# Patient Record
Sex: MALE | Race: OTHER
[De-identification: names, ages, dates, MRNs, and addresses within clinical notes are randomized per-mention and may not be internally consistent; named-entity substitution may affect disease eponyms.]

---

## 2023-09-07 ENCOUNTER — TRANSCRIPTION ENCOUNTER (OUTPATIENT)
Age: 53
End: 2023-09-07

## 2023-09-07 ENCOUNTER — INPATIENT (INPATIENT)
Facility: HOSPITAL | Age: 53
LOS: 1 days | Discharge: HOME | DRG: 419 | End: 2023-09-09
Attending: SURGERY | Admitting: SURGERY
Payer: COMMERCIAL

## 2023-09-07 VITALS
OXYGEN SATURATION: 96 % | SYSTOLIC BLOOD PRESSURE: 155 MMHG | WEIGHT: 188.5 LBS | RESPIRATION RATE: 18 BRPM | HEIGHT: 70.87 IN | HEART RATE: 88 BPM | TEMPERATURE: 98 F | DIASTOLIC BLOOD PRESSURE: 98 MMHG

## 2023-09-07 LAB
ALBUMIN SERPL ELPH-MCNC: 4.9 G/DL — SIGNIFICANT CHANGE UP (ref 3.3–5.2)
ALP SERPL-CCNC: 58 U/L — SIGNIFICANT CHANGE UP (ref 40–120)
ALT FLD-CCNC: 21 U/L — SIGNIFICANT CHANGE UP
ANION GAP SERPL CALC-SCNC: 14 MMOL/L — SIGNIFICANT CHANGE UP (ref 5–17)
APTT BLD: 30.3 SEC — SIGNIFICANT CHANGE UP (ref 24.5–35.6)
AST SERPL-CCNC: 20 U/L — SIGNIFICANT CHANGE UP
BASOPHILS # BLD AUTO: 0.06 K/UL — SIGNIFICANT CHANGE UP (ref 0–0.2)
BASOPHILS NFR BLD AUTO: 0.9 % — SIGNIFICANT CHANGE UP (ref 0–2)
BILIRUB SERPL-MCNC: 0.7 MG/DL — SIGNIFICANT CHANGE UP (ref 0.4–2)
BLD GP AB SCN SERPL QL: SIGNIFICANT CHANGE UP
BUN SERPL-MCNC: 22.3 MG/DL — HIGH (ref 8–20)
CALCIUM SERPL-MCNC: 9.8 MG/DL — SIGNIFICANT CHANGE UP (ref 8.4–10.5)
CHLORIDE SERPL-SCNC: 103 MMOL/L — SIGNIFICANT CHANGE UP (ref 96–108)
CO2 SERPL-SCNC: 24 MMOL/L — SIGNIFICANT CHANGE UP (ref 22–29)
CREAT SERPL-MCNC: 1.06 MG/DL — SIGNIFICANT CHANGE UP (ref 0.5–1.3)
EGFR: 84 ML/MIN/1.73M2 — SIGNIFICANT CHANGE UP
EOSINOPHIL # BLD AUTO: 0.43 K/UL — SIGNIFICANT CHANGE UP (ref 0–0.5)
EOSINOPHIL NFR BLD AUTO: 6.3 % — HIGH (ref 0–6)
GLUCOSE SERPL-MCNC: 106 MG/DL — HIGH (ref 70–99)
HCT VFR BLD CALC: 44.1 % — SIGNIFICANT CHANGE UP (ref 39–50)
HGB BLD-MCNC: 15 G/DL — SIGNIFICANT CHANGE UP (ref 13–17)
IMM GRANULOCYTES NFR BLD AUTO: 0.1 % — SIGNIFICANT CHANGE UP (ref 0–0.9)
INR BLD: 0.96 RATIO — SIGNIFICANT CHANGE UP (ref 0.85–1.18)
LIDOCAIN IGE QN: 19 U/L — LOW (ref 22–51)
LYMPHOCYTES # BLD AUTO: 2.52 K/UL — SIGNIFICANT CHANGE UP (ref 1–3.3)
LYMPHOCYTES # BLD AUTO: 37.2 % — SIGNIFICANT CHANGE UP (ref 13–44)
MCHC RBC-ENTMCNC: 30 PG — SIGNIFICANT CHANGE UP (ref 27–34)
MCHC RBC-ENTMCNC: 34 GM/DL — SIGNIFICANT CHANGE UP (ref 32–36)
MCV RBC AUTO: 88.2 FL — SIGNIFICANT CHANGE UP (ref 80–100)
MONOCYTES # BLD AUTO: 0.62 K/UL — SIGNIFICANT CHANGE UP (ref 0–0.9)
MONOCYTES NFR BLD AUTO: 9.1 % — SIGNIFICANT CHANGE UP (ref 2–14)
NEUTROPHILS # BLD AUTO: 3.14 K/UL — SIGNIFICANT CHANGE UP (ref 1.8–7.4)
NEUTROPHILS NFR BLD AUTO: 46.4 % — SIGNIFICANT CHANGE UP (ref 43–77)
PLATELET # BLD AUTO: 332 K/UL — SIGNIFICANT CHANGE UP (ref 150–400)
POTASSIUM SERPL-MCNC: 3.9 MMOL/L — SIGNIFICANT CHANGE UP (ref 3.5–5.3)
POTASSIUM SERPL-SCNC: 3.9 MMOL/L — SIGNIFICANT CHANGE UP (ref 3.5–5.3)
PROT SERPL-MCNC: 7.8 G/DL — SIGNIFICANT CHANGE UP (ref 6.6–8.7)
PROTHROM AB SERPL-ACNC: 10.7 SEC — SIGNIFICANT CHANGE UP (ref 9.5–13)
RBC # BLD: 5 M/UL — SIGNIFICANT CHANGE UP (ref 4.2–5.8)
RBC # FLD: 13.2 % — SIGNIFICANT CHANGE UP (ref 10.3–14.5)
SODIUM SERPL-SCNC: 141 MMOL/L — SIGNIFICANT CHANGE UP (ref 135–145)
WBC # BLD: 6.78 K/UL — SIGNIFICANT CHANGE UP (ref 3.8–10.5)
WBC # FLD AUTO: 6.78 K/UL — SIGNIFICANT CHANGE UP (ref 3.8–10.5)

## 2023-09-07 PROCEDURE — 76705 ECHO EXAM OF ABDOMEN: CPT | Mod: 26

## 2023-09-07 PROCEDURE — 99285 EMERGENCY DEPT VISIT HI MDM: CPT

## 2023-09-07 RX ORDER — ONDANSETRON 8 MG/1
4 TABLET, FILM COATED ORAL ONCE
Refills: 0 | Status: COMPLETED | OUTPATIENT
Start: 2023-09-07 | End: 2023-09-07

## 2023-09-07 RX ORDER — ENOXAPARIN SODIUM 100 MG/ML
40 INJECTION SUBCUTANEOUS EVERY 24 HOURS
Refills: 0 | Status: DISCONTINUED | OUTPATIENT
Start: 2023-09-07 | End: 2023-09-09

## 2023-09-07 RX ORDER — SODIUM CHLORIDE 9 MG/ML
1000 INJECTION INTRAMUSCULAR; INTRAVENOUS; SUBCUTANEOUS ONCE
Refills: 0 | Status: COMPLETED | OUTPATIENT
Start: 2023-09-07 | End: 2023-09-07

## 2023-09-07 RX ORDER — CEFOTETAN DISODIUM 1 G
2 VIAL (EA) INJECTION EVERY 12 HOURS
Refills: 0 | Status: DISCONTINUED | OUTPATIENT
Start: 2023-09-07 | End: 2023-09-08

## 2023-09-07 RX ORDER — MORPHINE SULFATE 50 MG/1
4 CAPSULE, EXTENDED RELEASE ORAL ONCE
Refills: 0 | Status: DISCONTINUED | OUTPATIENT
Start: 2023-09-07 | End: 2023-09-07

## 2023-09-07 RX ORDER — ACETAMINOPHEN 500 MG
1000 TABLET ORAL ONCE
Refills: 0 | Status: COMPLETED | OUTPATIENT
Start: 2023-09-07 | End: 2023-09-08

## 2023-09-07 RX ORDER — PIPERACILLIN AND TAZOBACTAM 4; .5 G/20ML; G/20ML
3.38 INJECTION, POWDER, LYOPHILIZED, FOR SOLUTION INTRAVENOUS ONCE
Refills: 0 | Status: COMPLETED | OUTPATIENT
Start: 2023-09-07 | End: 2023-09-07

## 2023-09-07 RX ORDER — SODIUM CHLORIDE 9 MG/ML
1000 INJECTION INTRAMUSCULAR; INTRAVENOUS; SUBCUTANEOUS
Refills: 0 | Status: DISCONTINUED | OUTPATIENT
Start: 2023-09-07 | End: 2023-09-08

## 2023-09-07 RX ADMIN — PIPERACILLIN AND TAZOBACTAM 200 GRAM(S): 4; .5 INJECTION, POWDER, LYOPHILIZED, FOR SOLUTION INTRAVENOUS at 21:36

## 2023-09-07 RX ADMIN — ONDANSETRON 4 MILLIGRAM(S): 8 TABLET, FILM COATED ORAL at 21:36

## 2023-09-07 RX ADMIN — SODIUM CHLORIDE 1000 MILLILITER(S): 9 INJECTION INTRAMUSCULAR; INTRAVENOUS; SUBCUTANEOUS at 21:35

## 2023-09-07 RX ADMIN — PIPERACILLIN AND TAZOBACTAM 3.38 GRAM(S): 4; .5 INJECTION, POWDER, LYOPHILIZED, FOR SOLUTION INTRAVENOUS at 22:00

## 2023-09-07 RX ADMIN — MORPHINE SULFATE 4 MILLIGRAM(S): 50 CAPSULE, EXTENDED RELEASE ORAL at 21:36

## 2023-09-07 RX ADMIN — SODIUM CHLORIDE 1000 MILLILITER(S): 9 INJECTION INTRAMUSCULAR; INTRAVENOUS; SUBCUTANEOUS at 22:30

## 2023-09-07 RX ADMIN — MORPHINE SULFATE 4 MILLIGRAM(S): 50 CAPSULE, EXTENDED RELEASE ORAL at 23:00

## 2023-09-07 NOTE — ED STATDOCS - OBJECTIVE STATEMENT
54 y/o male no PMHx c/o 2 week of RUQ pain, worse after eating. Pt went to Primary care and got ultrasound. Ultrasound reading showed cholecystitis with CBD dilatation and no visualized stones . Pt was told to follow up with GI or surgery. Pt came to ED because Increased RUQ pain. Pt has mild nausea no vomiting, no other symptoms     Denies f/c/v/cp/sob/palpitations/cough/rash/headache/dizziness/d/c/dysuria/hematuria 52 y/o male no PMHx c/o 2 week of RUQ pain, worse after eating. Pt went to Primary care and got ultrasound. Ultrasound reading showed cholelithiasis with acute cholecystitis with CBD dilatation and no visualized stones in cbd . Pt was told to follow up with GI or surgery. Pt came to ED because Increased RUQ pain. Pt has mild nausea no vomiting, no other symptoms     Denies f/c/v/cp/sob/palpitations/cough/rash/headache/dizziness/d/c/dysuria/hematuria

## 2023-09-07 NOTE — ED STATDOCS - CLINICAL SUMMARY MEDICAL DECISION MAKING FREE TEXT BOX
52 y/o male no PMHx c/o 2 week of RUQ pain, worse after eating. Outpatient Ultrasound showed cholecystitis with CBD dilatation 0.9 cm without visualized stones. Will check labs, sono, ct scan, and reassess. 54 y/o male no PMHx c/o 2 week of RUQ pain, worse after eating. Outpatient Ultrasound showed cholecystitis with CBD dilatation 0.9 cm without visualized stones.     acute jesus on outpatient sono - Will check labs, sono, ct scan, and reassess. surgery consulted

## 2023-09-07 NOTE — H&P ADULT - ATTENDING COMMENTS
Ross is a 54 yo healthy M who presents to the ED with a  two day hx of RUQ pain. Patient was seen outpatient with RUQ U/S that demonstrated cholelithiasis concern for acute jesus. He was instructed to come into ED for evaluation. Denies any N&V, fever/chills,  changes in bowel habits, diarrhea/constipation,  CP and/or SOB. RUQ  U/S demonstrates cholelithiasis with associated edematous wall thickening, pericholecystic fluid, dilated CBD. Labs unremarkable.   Awake alert  Bilateral BS  Hemodynamic intact  Abdomen soft, active BS, tender RUQ with rebound, no guarding.  Neurologic grossly intact   For lap choly

## 2023-09-07 NOTE — H&P ADULT - ASSESSMENT
A: 52 yo M who presents with intractable 2/2 to symptomatic cholelithiasis.     Plan:   -  Admit to Surgery- Dr. Hagan   - Add on to OR for lap jesus on 9/9  - NPO w/IVF   - Cefotetan    - Trend LFTs   - Pain control PRN   - DVT ppx     Plan discussed with Surgery Attending- Dr. Hagan.

## 2023-09-07 NOTE — ED STATDOCS - PHYSICAL EXAMINATION
Head: atraumatic, normacephalic  Face: atraumatic, no crepitus no orbiral/maxillary/mandibular ttp  throat: uvula midline no exudates  eyes: perrla eomi  heart: rrr s1s2  lungs: ctab  abd: epigastriic tenderness, RUQ tenderness tensoft, nd +bs no rebound/guarding no cva ttp  skin: warm  LE: no swelling, no calf ttp  back: no midline cervical/thoracic/lumbar ttp Head: atraumatic, normacephalic  Face: atraumatic, no crepitus no orbiral/maxillary/mandibular ttp  throat: uvula midline no exudates  eyes: perrla eomi  heart: rrr s1s2  lungs: ctab  abd: epigastriic tenderness, RUQ tenderness ten, soft, nd +bs no rebound/guarding no cva ttp  skin: warm  LE: no swelling, no calf ttp  back: no midline cervical/thoracic/lumbar ttp

## 2023-09-07 NOTE — H&P ADULT - HISTORY OF PRESENT ILLNESS
Surgery H&P    Admitting attending: Dr. Hagan      HPI: Mr Hawkins is a 52 yo healthy M who presents to the ED with a  two day hx of RUQ pain. Patient was seen outpatient with RUQ U/S that demonstrated cholelithiasis concern for acute jesus. He was instructed to come into ED for evaluation. RUQ  U/S demonstrates cholelithiasis with associated edematous wall thickening, pericholecystic fluid, dilated CBD         PAST MEDICAL HISTORY:    Denies     PAST SURGICAL HISTORY:    Denies     MEDICATIONS:    None     ALLERGIES:  No Known Allergies        VITALS & I/Os:  Vital Signs Last 24 Hrs  T(C): 36.9 (07 Sep 2023 19:22), Max: 36.9 (07 Sep 2023 19:22)  T(F): 98.5 (07 Sep 2023 19:22), Max: 98.5 (07 Sep 2023 19:22)  HR: 88 (07 Sep 2023 19:22) (88 - 88)  BP: 155/98 (07 Sep 2023 19:22) (155/98 - 155/98)  BP(mean): --  RR: 18 (07 Sep 2023 19:22) (18 - 18)  SpO2: 96% (07 Sep 2023 19:22) (96% - 96%)    Parameters below as of 07 Sep 2023 19:22  Patient On (Oxygen Delivery Method): room air        I&O's Summary        PHYSICAL EXAM:  Constitutional: patient resting comfortably in bed, in no acute distress  HEENT: EOMI / PERRL b/l, no active drainage or redness  Neck: No JVD, full ROM without pain  Respiratory: respirations are unlabored, no accessory muscle use, no conversational dyspnea  Cardiovascular: regular rate & rhythm  Gastrointestinal: Abdomen soft, non-tender, non-distended, no rebound tenderness / guarding  Neurological: GCS: 15 (4/5/6). A&O x 3; no gross sensory / motor / coordination deficits  Psychiatric: Normal mood, normal affect  Musculoskeletal: No joint pain, swelling or deformity; no limitation of movement  Vascular:        LABS:                        15.0   6.78  )-----------( 332      ( 07 Sep 2023 20:15 )             44.1     09-07    141  |  103  |  22.3<H>  ----------------------------<  106<H>  3.9   |  24.0  |  1.06    Ca    9.8      07 Sep 2023 20:15    TPro  7.8  /  Alb  4.9  /  TBili  0.7  /  DBili  x   /  AST  20  /  ALT  21  /  AlkPhos  58  09-07    Lactate:    PT/INR - ( 07 Sep 2023 20:15 )   PT: 10.7 sec;   INR: 0.96 ratio         PTT - ( 07 Sep 2023 20:15 )  PTT:30.3 sec          Urinalysis Basic - ( 07 Sep 2023 20:15 )    Color: x / Appearance: x / SG: x / pH: x  Gluc: 106 mg/dL / Ketone: x  / Bili: x / Urobili: x   Blood: x / Protein: x / Nitrite: x   Leuk Esterase: x / RBC: x / WBC x   Sq Epi: x / Non Sq Epi: x / Bacteria: x          IMAGING:                                                                                Surgery H&P    Admitting attending: Dr. Hagan      HPI: Mr Hawkins is a 52 yo healthy M who presents to the ED with a  two day hx of RUQ pain. Patient was seen outpatient with RUQ U/S that demonstrated cholelithiasis concern for acute jesus. He was instructed to come into ED for evaluation. Denies any N&V, fever/chills,  changes in bowel habits, diarrhea/constipation,  CP and/or SOB. RUQ  U/S demonstrates cholelithiasis with associated edematous wall thickening, pericholecystic fluid, dilated CBD. Labs unremarkable. Patient is afebrile and HDS.       PAST MEDICAL HISTORY:    Denies     PAST SURGICAL HISTORY:    Denies     MEDICATIONS:    None     ALLERGIES:  No Known Allergies        VITALS & I/Os:  Vital Signs Last 24 Hrs  T(C): 36.9 (07 Sep 2023 19:22), Max: 36.9 (07 Sep 2023 19:22)  T(F): 98.5 (07 Sep 2023 19:22), Max: 98.5 (07 Sep 2023 19:22)  HR: 88 (07 Sep 2023 19:22) (88 - 88)  BP: 155/98 (07 Sep 2023 19:22) (155/98 - 155/98)  BP(mean): --  RR: 18 (07 Sep 2023 19:22) (18 - 18)  SpO2: 96% (07 Sep 2023 19:22) (96% - 96%)    Parameters below as of 07 Sep 2023 19:22  Patient On (Oxygen Delivery Method): room air        I&O's Summary        PHYSICAL EXAM:  Constitutional: patient resting comfortably in bed, in no acute distress  Respiratory: respirations are unlabored, no accessory muscle use, no conversational dyspnea  Gastrointestinal: Abdomen soft, RUQ pain, no [place] sign,  non-distended, no rebound tenderness / guarding  Neurological: A&O x 3        LABS:                        15.0   6.78  )-----------( 332      ( 07 Sep 2023 20:15 )             44.1     09-07    141  |  103  |  22.3<H>  ----------------------------<  106<H>  3.9   |  24.0  |  1.06    Ca    9.8      07 Sep 2023 20:15    TPro  7.8  /  Alb  4.9  /  TBili  0.7  /  DBili  x   /  AST  20  /  ALT  21  /  AlkPhos  58  09-07    Lactate:    PT/INR - ( 07 Sep 2023 20:15 )   PT: 10.7 sec;   INR: 0.96 ratio         PTT - ( 07 Sep 2023 20:15 )  PTT:30.3 sec          Urinalysis Basic - ( 07 Sep 2023 20:15 )    Color: x / Appearance: x / SG: x / pH: x  Gluc: 106 mg/dL / Ketone: x  / Bili: x / Urobili: x   Blood: x / Protein: x / Nitrite: x   Leuk Esterase: x / RBC: x / WBC x   Sq Epi: x / Non Sq Epi: x / Bacteria: x          IMAGING:  < from: US Abdomen Upper Quadrant Right (09.07.23 @ 21:07) >    FINDINGS:  Liver: Within normal limits.  Bile ducts: Mild intrahepatic ductal dilatation. CBD dilatation up to 1   cm. Gallbladder: Cholelithiasis. Edematous wall thickening and small   pericholecystic fluid. Positive sonographic Mera sign.  Pancreas: Visualized portions are within normal limits.  Right kidney: 11.4 cm. No hydronephrosis.  Ascites: None.  IVC: Visualized portions are within normal limits.    IMPRESSION:  Cholelithiasis with associated edematous wall thickening, pericholecystic   fluid and positive sonographic Mera sign, highly suspicious for acute   cholecystitis.    Mild intrahepatic ductal dilatation and CBD dilatation up to 1 cm   suspicious for underlying choledocholithiasis. Recommend correlation with   already ordered CT.

## 2023-09-07 NOTE — ED STATDOCS - ATTENDING APP SHARED VISIT CONTRIBUTION OF CARE
I, Yoly Reid, performed the initial face to face bedside interview with this patient regarding history of present illness, review of symptoms and relevant past medical, social and family history.  I completed an independent physical examination.  I was the initial provider who evaluated this patient. I have signed out the follow up of any pending tests (i.e. labs, radiological studies) to the ACP.  I have communicated the patient’s plan of care and disposition with the ACP.

## 2023-09-07 NOTE — ED ADULT TRIAGE NOTE - CHIEF COMPLAINT QUOTE
Patient presents to ED with c/o right upper quadrant abdominal pain radiating to right flank times one week but pain got more intense since yesterday.  (+) burning on urination.  Had sono done on 9/1 that showed cholelithiasis with evidence of acute cholecystitis.  Last Tylenol 3 hours ago.

## 2023-09-07 NOTE — ED ADULT NURSE NOTE - NSFALLUNIVINTERV_ED_ALL_ED
Bed/Stretcher in lowest position, wheels locked, appropriate side rails in place/Call bell, personal items and telephone in reach/Instruct patient to call for assistance before getting out of bed/chair/stretcher/Non-slip footwear applied when patient is off stretcher/[place] to call system/Physically safe environment - no spills, clutter or unnecessary equipment/Purposeful proactive rounding/Room/bathroom lighting operational, light cord in reach

## 2023-09-08 ENCOUNTER — TRANSCRIPTION ENCOUNTER (OUTPATIENT)
Age: 53
End: 2023-09-08

## 2023-09-08 DIAGNOSIS — K81.9 CHOLECYSTITIS, UNSPECIFIED: ICD-10-CM

## 2023-09-08 LAB
ALBUMIN SERPL ELPH-MCNC: 4.2 G/DL — SIGNIFICANT CHANGE UP (ref 3.3–5.2)
ALP SERPL-CCNC: 54 U/L — SIGNIFICANT CHANGE UP (ref 40–120)
ALT FLD-CCNC: 17 U/L — SIGNIFICANT CHANGE UP
ANION GAP SERPL CALC-SCNC: 13 MMOL/L — SIGNIFICANT CHANGE UP (ref 5–17)
AST SERPL-CCNC: 14 U/L — SIGNIFICANT CHANGE UP
BILIRUB DIRECT SERPL-MCNC: 0.2 MG/DL — SIGNIFICANT CHANGE UP (ref 0–0.3)
BILIRUB INDIRECT FLD-MCNC: 0.6 MG/DL — SIGNIFICANT CHANGE UP (ref 0.2–1)
BILIRUB SERPL-MCNC: 0.7 MG/DL — SIGNIFICANT CHANGE UP (ref 0.4–2)
BUN SERPL-MCNC: 19.3 MG/DL — SIGNIFICANT CHANGE UP (ref 8–20)
CALCIUM SERPL-MCNC: 8.9 MG/DL — SIGNIFICANT CHANGE UP (ref 8.4–10.5)
CHLORIDE SERPL-SCNC: 102 MMOL/L — SIGNIFICANT CHANGE UP (ref 96–108)
CO2 SERPL-SCNC: 24 MMOL/L — SIGNIFICANT CHANGE UP (ref 22–29)
CREAT SERPL-MCNC: 1.17 MG/DL — SIGNIFICANT CHANGE UP (ref 0.5–1.3)
EGFR: 75 ML/MIN/1.73M2 — SIGNIFICANT CHANGE UP
GLUCOSE SERPL-MCNC: 127 MG/DL — HIGH (ref 70–99)
HCT VFR BLD CALC: 42.4 % — SIGNIFICANT CHANGE UP (ref 39–50)
HGB BLD-MCNC: 14.1 G/DL — SIGNIFICANT CHANGE UP (ref 13–17)
MAGNESIUM SERPL-MCNC: 2.1 MG/DL — SIGNIFICANT CHANGE UP (ref 1.6–2.6)
MCHC RBC-ENTMCNC: 30 PG — SIGNIFICANT CHANGE UP (ref 27–34)
MCHC RBC-ENTMCNC: 33.3 GM/DL — SIGNIFICANT CHANGE UP (ref 32–36)
MCV RBC AUTO: 90.2 FL — SIGNIFICANT CHANGE UP (ref 80–100)
PHOSPHATE SERPL-MCNC: 2.9 MG/DL — SIGNIFICANT CHANGE UP (ref 2.4–4.7)
PLATELET # BLD AUTO: 279 K/UL — SIGNIFICANT CHANGE UP (ref 150–400)
POTASSIUM SERPL-MCNC: 4.1 MMOL/L — SIGNIFICANT CHANGE UP (ref 3.5–5.3)
POTASSIUM SERPL-SCNC: 4.1 MMOL/L — SIGNIFICANT CHANGE UP (ref 3.5–5.3)
PROT SERPL-MCNC: 7.1 G/DL — SIGNIFICANT CHANGE UP (ref 6.6–8.7)
RBC # BLD: 4.7 M/UL — SIGNIFICANT CHANGE UP (ref 4.2–5.8)
RBC # FLD: 13.2 % — SIGNIFICANT CHANGE UP (ref 10.3–14.5)
SODIUM SERPL-SCNC: 139 MMOL/L — SIGNIFICANT CHANGE UP (ref 135–145)
WBC # BLD: 6.46 K/UL — SIGNIFICANT CHANGE UP (ref 3.8–10.5)
WBC # FLD AUTO: 6.46 K/UL — SIGNIFICANT CHANGE UP (ref 3.8–10.5)

## 2023-09-08 PROCEDURE — 88304 TISSUE EXAM BY PATHOLOGIST: CPT | Mod: 26

## 2023-09-08 PROCEDURE — 47563 LAPARO CHOLECYSTECTOMY/GRAPH: CPT

## 2023-09-08 PROCEDURE — 99222 1ST HOSP IP/OBS MODERATE 55: CPT | Mod: 57

## 2023-09-08 DEVICE — IMPLANTABLE DEVICE: Type: IMPLANTABLE DEVICE | Status: FUNCTIONAL

## 2023-09-08 DEVICE — CLIP APPLIER COVIDIEN ENDOCLIP III 5MM: Type: IMPLANTABLE DEVICE | Status: FUNCTIONAL

## 2023-09-08 RX ORDER — ACETAMINOPHEN 500 MG
1000 TABLET ORAL ONCE
Refills: 0 | Status: DISCONTINUED | OUTPATIENT
Start: 2023-09-08 | End: 2023-09-08

## 2023-09-08 RX ORDER — FENTANYL CITRATE 50 UG/ML
25 INJECTION INTRAVENOUS
Refills: 0 | Status: DISCONTINUED | OUTPATIENT
Start: 2023-09-08 | End: 2023-09-08

## 2023-09-08 RX ORDER — IBUPROFEN 200 MG
600 TABLET ORAL EVERY 6 HOURS
Refills: 0 | Status: DISCONTINUED | OUTPATIENT
Start: 2023-09-08 | End: 2023-09-09

## 2023-09-08 RX ORDER — ONDANSETRON 8 MG/1
4 TABLET, FILM COATED ORAL ONCE
Refills: 0 | Status: DISCONTINUED | OUTPATIENT
Start: 2023-09-08 | End: 2023-09-08

## 2023-09-08 RX ORDER — HYDROMORPHONE HYDROCHLORIDE 2 MG/ML
1 INJECTION INTRAMUSCULAR; INTRAVENOUS; SUBCUTANEOUS
Refills: 0 | Status: DISCONTINUED | OUTPATIENT
Start: 2023-09-08 | End: 2023-09-08

## 2023-09-08 RX ORDER — ACETAMINOPHEN 500 MG
975 TABLET ORAL ONCE
Refills: 0 | Status: COMPLETED | OUTPATIENT
Start: 2023-09-08 | End: 2023-09-08

## 2023-09-08 RX ORDER — SODIUM CHLORIDE 9 MG/ML
1000 INJECTION, SOLUTION INTRAVENOUS
Refills: 0 | Status: DISCONTINUED | OUTPATIENT
Start: 2023-09-08 | End: 2023-09-08

## 2023-09-08 RX ORDER — KETOROLAC TROMETHAMINE 30 MG/ML
30 SYRINGE (ML) INJECTION ONCE
Refills: 0 | Status: DISCONTINUED | OUTPATIENT
Start: 2023-09-08 | End: 2023-09-08

## 2023-09-08 RX ADMIN — ENOXAPARIN SODIUM 40 MILLIGRAM(S): 100 INJECTION SUBCUTANEOUS at 18:46

## 2023-09-08 RX ADMIN — Medication 100 GRAM(S): at 05:09

## 2023-09-08 RX ADMIN — Medication 1000 MILLIGRAM(S): at 19:45

## 2023-09-08 RX ADMIN — Medication 400 MILLIGRAM(S): at 18:45

## 2023-09-08 RX ADMIN — SODIUM CHLORIDE 100 MILLILITER(S): 9 INJECTION INTRAMUSCULAR; INTRAVENOUS; SUBCUTANEOUS at 02:32

## 2023-09-08 RX ADMIN — Medication 975 MILLIGRAM(S): at 23:47

## 2023-09-08 RX ADMIN — Medication 975 MILLIGRAM(S): at 22:47

## 2023-09-08 NOTE — PROGRESS NOTE ADULT - ATTENDING COMMENTS
Patient seen and examined at bedside. Patient laying comfortable in bed. States his pain has improved overnight. Denies any nausea or vomiting. afebrile. NPO on IVF.     Plan for OR today for laparoscopic cholecystectomy with Intraoperative cholangiogram  Keep NPO

## 2023-09-08 NOTE — BRIEF OPERATIVE NOTE - OPERATION/FINDINGS
cystic artery identified , clipped and transected , cystic duct identified , complete IOC initial imaging with some stones noticed in CBD ,flushed ,repeat IOC complete with no evidence of stones in CBD, gallbladder dissection from cystic plate completed cystic artery identified , clipped and transected , cystic duct identified , complete IOC initial imaging with some stones noticed in CBD ,flushed ,repeat IOC complete " please review imaging " , gallbladder dissection from cystic plate completed

## 2023-09-08 NOTE — CHART NOTE - NSCHARTNOTEFT_GEN_A_CORE
Post-op Check    Subjective:  Pt offers no acute complaints at this time. Pain well controlled on current regiment. Denies nausea, vomiting, chest pain, SOB, palpitations.     MEDICATIONS  (STANDING):  enoxaparin Injectable 40 milliGRAM(s) SubCutaneous every 24 hours    MEDICATIONS  (PRN):  ibuprofen  Tablet. 600 milliGRAM(s) Oral every 6 hours PRN Mild Pain (1 - 3), Moderate Pain (4 - 6)    Vital Signs Last 24 Hrs  T(C): 36.9 (08 Sep 2023 22:02), Max: 36.9 (08 Sep 2023 22:02)  T(F): 98.5 (08 Sep 2023 22:02), Max: 98.5 (08 Sep 2023 22:02)  HR: 89 (08 Sep 2023 22:02) (66 - 89)  BP: 157/88 (08 Sep 2023 22:02) (130/79 - 159/88)  BP(mean): 97 (08 Sep 2023 17:55) (97 - 102)  RR: 17 (08 Sep 2023 22:02) (15 - 21)  SpO2: 93% (08 Sep 2023 22:02) (93% - 99%)  Parameters below as of 08 Sep 2023 22:02  Patient On (Oxygen Delivery Method): room air    Physical Exam:  General: Laying comfortably in bed, NAD  HEENT: PERRL, EOMI  Neck: No JVD, FROM without pain  Respiratory: no accessory muscle use, respirations non-labored  Abdomen: soft, appropriately tender around incisions, incisions c/d/i   Neurological: A&O x 3; without gross deficit    A: Patient is a 52 yo M s/p lap jesus pod#0.     P:  AM LFTs   Continue current care  Pain control  OOB as tolerated  Encourage IS  DVT ppx

## 2023-09-08 NOTE — PROGRESS NOTE ADULT - ASSESSMENT
53 M otherwise healthy with acute cholecystitis, pending OR, on abx    -- Cefotetan  -- NPO  -- IVF  - OR pending availability

## 2023-09-08 NOTE — BRIEF OPERATIVE NOTE - NSICDXBRIEFPROCEDURE_GEN_ALL_CORE_FT
PROCEDURES:  Cholecystectomy, with intraoperative cholangiogram 08-Sep-2023 17:05:51  Rico Kennedy

## 2023-09-08 NOTE — PROGRESS NOTE ADULT - SUBJECTIVE AND OBJECTIVE BOX
HPI/OVERNIGHT EVENTS: Patient seen and examined at bedside this AM. pain improved, [place] speaking, afebrile    Vital Signs Last 24 Hrs  T(C): 36.4 (08 Sep 2023 04:30), Max: 36.9 (07 Sep 2023 19:22)  T(F): 97.6 (08 Sep 2023 04:30), Max: 98.5 (07 Sep 2023 19:22)  HR: 71 (08 Sep 2023 04:30) (66 - 88)  BP: 155/80 (08 Sep 2023 04:30) (130/79 - 155/98)  BP(mean): --  RR: 18 (08 Sep 2023 04:30) (18 - 18)  SpO2: 97% (08 Sep 2023 04:30) (96% - 98%)    Parameters below as of 08 Sep 2023 04:30  Patient On (Oxygen Delivery Method): room air        I&O's Detail      Constitutional: patient resting comfortably in bed, in no acute distress  HEENT: EOMI, PERRLA, MMM.  Respiratory: Non labored breathing on RA  Cardiovascular: RRR  Gastrointestinal: Abdomen soft, minimally RUQ tenderness, non-distended, no rebound tenderness / guarding  Musculoskeletal: No joint pain, swelling or deformity; no limitation of movement  Vascular: Extremities warm and well perfused.     LABS:                        14.1   6.46  )-----------( 279      ( 08 Sep 2023 06:05 )             42.4     09-08    139  |  102  |  19.3  ----------------------------<  127<H>  4.1   |  24.0  |  1.17    Ca    8.9      08 Sep 2023 06:05  Phos  2.9     09-08  Mg     2.1     09-08    TPro  7.1  /  Alb  4.2  /  TBili  0.7  /  DBili  0.2  /  AST  14  /  ALT  17  /  AlkPhos  54  09-08    PT/INR - ( 07 Sep 2023 20:15 )   PT: 10.7 sec;   INR: 0.96 ratio         PTT - ( 07 Sep 2023 20:15 )  PTT:30.3 sec  Urinalysis Basic - ( 08 Sep 2023 06:05 )    Color: x / Appearance: x / SG: x / pH: x  Gluc: 127 mg/dL / Ketone: x  / Bili: x / Urobili: x   Blood: x / Protein: x / Nitrite: x   Leuk Esterase: x / RBC: x / WBC x   Sq Epi: x / Non Sq Epi: x / Bacteria: x        MEDICATIONS  (STANDING):  cefoTEtan  IVPB 2 Gram(s) IV Intermittent every 12 hours  enoxaparin Injectable 40 milliGRAM(s) SubCutaneous every 24 hours  sodium chloride 0.9%. 1000 milliLiter(s) (100 mL/Hr) IV Continuous <Continuous>    MEDICATIONS  (PRN):  acetaminophen   IVPB .. 1000 milliGRAM(s) IV Intermittent once PRN Mild Pain (1 - 3)

## 2023-09-08 NOTE — BRIEF OPERATIVE NOTE - NSICDXBRIEFPREOP_GEN_ALL_CORE_FT
PRE-OP DIAGNOSIS:  Calculus of gallbladder with acute cholecystitis 08-Sep-2023 17:06:21  Rico Kennedy

## 2023-09-09 ENCOUNTER — TRANSCRIPTION ENCOUNTER (OUTPATIENT)
Age: 53
End: 2023-09-09

## 2023-09-09 VITALS
DIASTOLIC BLOOD PRESSURE: 86 MMHG | SYSTOLIC BLOOD PRESSURE: 145 MMHG | OXYGEN SATURATION: 93 % | RESPIRATION RATE: 18 BRPM | TEMPERATURE: 98 F | HEART RATE: 71 BPM

## 2023-09-09 LAB
ALBUMIN SERPL ELPH-MCNC: 4 G/DL — SIGNIFICANT CHANGE UP (ref 3.3–5.2)
ALP SERPL-CCNC: 53 U/L — SIGNIFICANT CHANGE UP (ref 40–120)
ALT FLD-CCNC: 24 U/L — SIGNIFICANT CHANGE UP
ANION GAP SERPL CALC-SCNC: 13 MMOL/L — SIGNIFICANT CHANGE UP (ref 5–17)
AST SERPL-CCNC: 23 U/L — SIGNIFICANT CHANGE UP
BASOPHILS # BLD AUTO: 0.03 K/UL — SIGNIFICANT CHANGE UP (ref 0–0.2)
BASOPHILS NFR BLD AUTO: 0.3 % — SIGNIFICANT CHANGE UP (ref 0–2)
BILIRUB DIRECT SERPL-MCNC: 0.1 MG/DL — SIGNIFICANT CHANGE UP (ref 0–0.3)
BILIRUB INDIRECT FLD-MCNC: 0.6 MG/DL — SIGNIFICANT CHANGE UP (ref 0.2–1)
BILIRUB SERPL-MCNC: 0.7 MG/DL — SIGNIFICANT CHANGE UP (ref 0.4–2)
BUN SERPL-MCNC: 12.4 MG/DL — SIGNIFICANT CHANGE UP (ref 8–20)
CALCIUM SERPL-MCNC: 8.9 MG/DL — SIGNIFICANT CHANGE UP (ref 8.4–10.5)
CHLORIDE SERPL-SCNC: 102 MMOL/L — SIGNIFICANT CHANGE UP (ref 96–108)
CO2 SERPL-SCNC: 23 MMOL/L — SIGNIFICANT CHANGE UP (ref 22–29)
CREAT SERPL-MCNC: 0.93 MG/DL — SIGNIFICANT CHANGE UP (ref 0.5–1.3)
EGFR: 98 ML/MIN/1.73M2 — SIGNIFICANT CHANGE UP
EOSINOPHIL # BLD AUTO: 0.05 K/UL — SIGNIFICANT CHANGE UP (ref 0–0.5)
EOSINOPHIL NFR BLD AUTO: 0.6 % — SIGNIFICANT CHANGE UP (ref 0–6)
GLUCOSE SERPL-MCNC: 116 MG/DL — HIGH (ref 70–99)
HCT VFR BLD CALC: 39.9 % — SIGNIFICANT CHANGE UP (ref 39–50)
HCT VFR BLD CALC: 40.5 % — SIGNIFICANT CHANGE UP (ref 39–50)
HGB BLD-MCNC: 13.5 G/DL — SIGNIFICANT CHANGE UP (ref 13–17)
HGB BLD-MCNC: 13.5 G/DL — SIGNIFICANT CHANGE UP (ref 13–17)
IMM GRANULOCYTES NFR BLD AUTO: 0.4 % — SIGNIFICANT CHANGE UP (ref 0–0.9)
LYMPHOCYTES # BLD AUTO: 1.89 K/UL — SIGNIFICANT CHANGE UP (ref 1–3.3)
LYMPHOCYTES # BLD AUTO: 21.3 % — SIGNIFICANT CHANGE UP (ref 13–44)
MAGNESIUM SERPL-MCNC: 2 MG/DL — SIGNIFICANT CHANGE UP (ref 1.6–2.6)
MCHC RBC-ENTMCNC: 29.9 PG — SIGNIFICANT CHANGE UP (ref 27–34)
MCHC RBC-ENTMCNC: 30.3 PG — SIGNIFICANT CHANGE UP (ref 27–34)
MCHC RBC-ENTMCNC: 33.3 GM/DL — SIGNIFICANT CHANGE UP (ref 32–36)
MCHC RBC-ENTMCNC: 33.8 GM/DL — SIGNIFICANT CHANGE UP (ref 32–36)
MCV RBC AUTO: 89.7 FL — SIGNIFICANT CHANGE UP (ref 80–100)
MCV RBC AUTO: 89.8 FL — SIGNIFICANT CHANGE UP (ref 80–100)
MONOCYTES # BLD AUTO: 0.78 K/UL — SIGNIFICANT CHANGE UP (ref 0–0.9)
MONOCYTES NFR BLD AUTO: 8.8 % — SIGNIFICANT CHANGE UP (ref 2–14)
NEUTROPHILS # BLD AUTO: 6.1 K/UL — SIGNIFICANT CHANGE UP (ref 1.8–7.4)
NEUTROPHILS NFR BLD AUTO: 68.6 % — SIGNIFICANT CHANGE UP (ref 43–77)
PHOSPHATE SERPL-MCNC: 3.4 MG/DL — SIGNIFICANT CHANGE UP (ref 2.4–4.7)
PLATELET # BLD AUTO: 286 K/UL — SIGNIFICANT CHANGE UP (ref 150–400)
PLATELET # BLD AUTO: 286 K/UL — SIGNIFICANT CHANGE UP (ref 150–400)
POTASSIUM SERPL-MCNC: 3.7 MMOL/L — SIGNIFICANT CHANGE UP (ref 3.5–5.3)
POTASSIUM SERPL-SCNC: 3.7 MMOL/L — SIGNIFICANT CHANGE UP (ref 3.5–5.3)
PROT SERPL-MCNC: 6.8 G/DL — SIGNIFICANT CHANGE UP (ref 6.6–8.7)
RBC # BLD: 4.45 M/UL — SIGNIFICANT CHANGE UP (ref 4.2–5.8)
RBC # BLD: 4.51 M/UL — SIGNIFICANT CHANGE UP (ref 4.2–5.8)
RBC # FLD: 13.3 % — SIGNIFICANT CHANGE UP (ref 10.3–14.5)
RBC # FLD: 13.3 % — SIGNIFICANT CHANGE UP (ref 10.3–14.5)
SODIUM SERPL-SCNC: 138 MMOL/L — SIGNIFICANT CHANGE UP (ref 135–145)
WBC # BLD: 8.65 K/UL — SIGNIFICANT CHANGE UP (ref 3.8–10.5)
WBC # BLD: 8.89 K/UL — SIGNIFICANT CHANGE UP (ref 3.8–10.5)
WBC # FLD AUTO: 8.65 K/UL — SIGNIFICANT CHANGE UP (ref 3.8–10.5)
WBC # FLD AUTO: 8.89 K/UL — SIGNIFICANT CHANGE UP (ref 3.8–10.5)

## 2023-09-09 PROCEDURE — 99285 EMERGENCY DEPT VISIT HI MDM: CPT | Mod: 25

## 2023-09-09 PROCEDURE — 86901 BLOOD TYPING SEROLOGIC RH(D): CPT

## 2023-09-09 PROCEDURE — 76000 FLUOROSCOPY <1 HR PHYS/QHP: CPT

## 2023-09-09 PROCEDURE — 85730 THROMBOPLASTIN TIME PARTIAL: CPT

## 2023-09-09 PROCEDURE — 99024 POSTOP FOLLOW-UP VISIT: CPT

## 2023-09-09 PROCEDURE — 83735 ASSAY OF MAGNESIUM: CPT

## 2023-09-09 PROCEDURE — 86850 RBC ANTIBODY SCREEN: CPT

## 2023-09-09 PROCEDURE — 80053 COMPREHEN METABOLIC PANEL: CPT

## 2023-09-09 PROCEDURE — 80076 HEPATIC FUNCTION PANEL: CPT

## 2023-09-09 PROCEDURE — 84100 ASSAY OF PHOSPHORUS: CPT

## 2023-09-09 PROCEDURE — 96365 THER/PROPH/DIAG IV INF INIT: CPT

## 2023-09-09 PROCEDURE — 85610 PROTHROMBIN TIME: CPT

## 2023-09-09 PROCEDURE — 76705 ECHO EXAM OF ABDOMEN: CPT

## 2023-09-09 PROCEDURE — C9399: CPT

## 2023-09-09 PROCEDURE — 36415 COLL VENOUS BLD VENIPUNCTURE: CPT

## 2023-09-09 PROCEDURE — 82248 BILIRUBIN DIRECT: CPT

## 2023-09-09 PROCEDURE — C1887: CPT

## 2023-09-09 PROCEDURE — 96375 TX/PRO/DX INJ NEW DRUG ADDON: CPT

## 2023-09-09 PROCEDURE — 88304 TISSUE EXAM BY PATHOLOGIST: CPT

## 2023-09-09 PROCEDURE — 80048 BASIC METABOLIC PNL TOTAL CA: CPT

## 2023-09-09 PROCEDURE — 85025 COMPLETE CBC W/AUTO DIFF WBC: CPT

## 2023-09-09 PROCEDURE — 86900 BLOOD TYPING SEROLOGIC ABO: CPT

## 2023-09-09 PROCEDURE — 83690 ASSAY OF LIPASE: CPT

## 2023-09-09 PROCEDURE — C1889: CPT

## 2023-09-09 PROCEDURE — 85027 COMPLETE CBC AUTOMATED: CPT

## 2023-09-09 RX ORDER — ACETAMINOPHEN 500 MG
650 TABLET ORAL EVERY 6 HOURS
Refills: 0 | Status: DISCONTINUED | OUTPATIENT
Start: 2023-09-09 | End: 2023-09-09

## 2023-09-09 RX ORDER — IBUPROFEN 200 MG
1 TABLET ORAL
Qty: 15 | Refills: 0
Start: 2023-09-09

## 2023-09-09 RX ORDER — ACETAMINOPHEN 500 MG
2 TABLET ORAL
Qty: 0 | Refills: 0 | DISCHARGE
Start: 2023-09-09

## 2023-09-09 RX ORDER — IBUPROFEN 200 MG
600 TABLET ORAL EVERY 6 HOURS
Refills: 0 | Status: DISCONTINUED | OUTPATIENT
Start: 2023-09-09 | End: 2023-09-09

## 2023-09-09 RX ORDER — PANTOPRAZOLE SODIUM 20 MG/1
40 TABLET, DELAYED RELEASE ORAL DAILY
Refills: 0 | Status: DISCONTINUED | OUTPATIENT
Start: 2023-09-09 | End: 2023-09-09

## 2023-09-09 RX ORDER — IBUPROFEN 200 MG
1 TABLET ORAL
Qty: 0 | Refills: 0 | DISCHARGE
Start: 2023-09-09

## 2023-09-09 RX ORDER — HYDROMORPHONE HYDROCHLORIDE 2 MG/ML
0.5 INJECTION INTRAMUSCULAR; INTRAVENOUS; SUBCUTANEOUS ONCE
Refills: 0 | Status: DISCONTINUED | OUTPATIENT
Start: 2023-09-09 | End: 2023-09-09

## 2023-09-09 RX ORDER — KETOROLAC TROMETHAMINE 30 MG/ML
15 SYRINGE (ML) INJECTION EVERY 6 HOURS
Refills: 0 | Status: DISCONTINUED | OUTPATIENT
Start: 2023-09-09 | End: 2023-09-09

## 2023-09-09 RX ADMIN — Medication 650 MILLIGRAM(S): at 12:14

## 2023-09-09 RX ADMIN — Medication 650 MILLIGRAM(S): at 13:14

## 2023-09-09 RX ADMIN — Medication 15 MILLIGRAM(S): at 06:57

## 2023-09-09 RX ADMIN — Medication 650 MILLIGRAM(S): at 06:57

## 2023-09-09 RX ADMIN — PANTOPRAZOLE SODIUM 40 MILLIGRAM(S): 20 TABLET, DELAYED RELEASE ORAL at 12:14

## 2023-09-09 RX ADMIN — HYDROMORPHONE HYDROCHLORIDE 0.5 MILLIGRAM(S): 2 INJECTION INTRAMUSCULAR; INTRAVENOUS; SUBCUTANEOUS at 01:48

## 2023-09-09 RX ADMIN — Medication 15 MILLIGRAM(S): at 05:57

## 2023-09-09 RX ADMIN — Medication 650 MILLIGRAM(S): at 05:57

## 2023-09-09 RX ADMIN — HYDROMORPHONE HYDROCHLORIDE 0.5 MILLIGRAM(S): 2 INJECTION INTRAMUSCULAR; INTRAVENOUS; SUBCUTANEOUS at 00:48

## 2023-09-09 NOTE — DISCHARGE NOTE NURSING/CASE MANAGEMENT/SOCIAL WORK - NSDCPEFALRISK_GEN_ALL_CORE
For information on Fall & Injury Prevention, visit: [email or web address] OR  [email or web address] OR  https://www.cdc.gov/steadi/patient.html

## 2023-09-09 NOTE — DISCHARGE NOTE PROVIDER - NSDCMRMEDTOKEN_GEN_ALL_CORE_FT
acetaminophen 325 mg oral tablet: 2 tab(s) orally every 6 hours  ibuprofen 600 mg oral tablet: 1 tab(s) orally every 6 hours As needed Moderate Pain (4 - 6)  ibuprofen 600 mg oral tablet: 1 tab(s) orally every 6 hours as needed for Moderate Pain (4 - 6)

## 2023-09-09 NOTE — PROGRESS NOTE ADULT - SUBJECTIVE AND OBJECTIVE BOX
Subjective: Patient seen and examined at bedside, no acute complaints, no events overnight. He is ambulating, voiding, and tolerating a regular diet.     MEDICATIONS  (STANDING):  enoxaparin Injectable 40 milliGRAM(s) SubCutaneous every 24 hours    MEDICATIONS  (PRN):  ibuprofen  Tablet. 600 milliGRAM(s) Oral every 6 hours PRN Mild Pain (1 - 3), Moderate Pain (4 - 6)    Vital Signs Last 24 Hrs  T(C): 36.9 (08 Sep 2023 22:02), Max: 36.9 (08 Sep 2023 22:02)  T(F): 98.5 (08 Sep 2023 22:02), Max: 98.5 (08 Sep 2023 22:02)  HR: 89 (08 Sep 2023 22:02) (66 - 89)  BP: 157/88 (08 Sep 2023 22:02) (130/79 - 159/88)  BP(mean): 97 (08 Sep 2023 17:55) (97 - 102)  RR: 17 (08 Sep 2023 22:02) (15 - 21)  SpO2: 93% (08 Sep 2023 22:02) (93% - 99%)  Parameters below as of 08 Sep 2023 22:02  Patient On (Oxygen Delivery Method): room air    Physical Exam:  General: Laying comfortably in bed, NAD  HEENT: PERRL, EOMI  Neck: No JVD, FROM without pain  Respiratory: no accessory muscle use, respirations non-labored  Abdomen: soft, appropriately tender around incisions, incisions c/d/i   Neurological: A&O x 3; without gross deficit    LABS:                     14.1   6.46  )-----------( 279      ( 08 Sep 2023 06:05 )             42.4   09-08  139  |  102  |  19.3  ----------------------------<  127<H>  4.1   |  24.0  |  1.17  Ca    8.9      08 Sep 2023 06:05  Phos  2.9     09-08  Mg     2.1     09-08  TPro  7.1  /  Alb  4.2  /  TBili  0.7  /  DBili  0.2  /  AST  14  /  ALT  17  /  AlkPhos  54  09-08  PT/INR - ( 07 Sep 2023 20:15 )   PT: 10.7 sec;   INR: 0.96 ratio      A: Patient is a 54 yo M s/p lap jesus, pod#1.     Plan:   Regular diet   AM labs, AM LFTs   Continue current care  Pain control  OOB as tolerated  Encourage IS  DVT ppx             Subjective: Patient seen and examined at bedside, no acute complaints, no events overnight. He is ambulating, voiding, and tolerating a regular diet.     MEDICATIONS  (STANDING):  enoxaparin Injectable 40 milliGRAM(s) SubCutaneous every 24 hours    MEDICATIONS  (PRN):  ibuprofen  Tablet. 600 milliGRAM(s) Oral every 6 hours PRN Mild Pain (1 - 3), Moderate Pain (4 - 6)    Vital Signs Last 24 Hrs  T(C): 36.9 (08 Sep 2023 22:02), Max: 36.9 (08 Sep 2023 22:02)  T(F): 98.5 (08 Sep 2023 22:02), Max: 98.5 (08 Sep 2023 22:02)  HR: 89 (08 Sep 2023 22:02) (66 - 89)  BP: 157/88 (08 Sep 2023 22:02) (130/79 - 159/88)  BP(mean): 97 (08 Sep 2023 17:55) (97 - 102)  RR: 17 (08 Sep 2023 22:02) (15 - 21)  SpO2: 93% (08 Sep 2023 22:02) (93% - 99%)  Parameters below as of 08 Sep 2023 22:02  Patient On (Oxygen Delivery Method): room air    Physical Exam:  General: Laying comfortably in bed, NAD  HEENT: PERRL, EOMI  Neck: No JVD, FROM without pain  Respiratory: no accessory muscle use, respirations non-labored  Abdomen: soft, appropriately tender around incisions, incisions c/d/i   Neurological: A&O x 3; without gross deficit    LABS:             pending     A: Patient is a 54 yo M s/p lap jesus, pod#1.     Plan:   Regular diet   AM labs, AM LFTs   Continue current care  Pain control  OOB as tolerated  Encourage IS  DVT ppx  Dc planning to home

## 2023-09-09 NOTE — DISCHARGE NOTE PROVIDER - HOSPITAL COURSE
53M who originally presented to the ED with a  two day hx of RUQ pain. Patient was seen outpatient with RUQ U/S that demonstrated cholelithiasis concern for acute jesus. He was instructed to come into ED for evaluation. RUQ U/S done here read as " highly suspicious for acute cholecystitis ".  Patient was admitted to undergo Laparoscopic Cholecystectomy.  The patient tolerated the procedure well and was transferred to the floor in stable condition.  The patient's diet was advanced PO pain medication ordered.  Once patient was ambulating well and voiding without difficulty, patient was found to be stable for discharge to home.  Pain was well-controlled at the time of discharge and the patient was tolerating a full diet.

## 2023-09-09 NOTE — DISCHARGE NOTE NURSING/CASE MANAGEMENT/SOCIAL WORK - PATIENT PORTAL LINK FT
You can access the FollowMyHealth Patient Portal offered by [facility] by registering at the following website: [email or web address]. By joining [employer]’s FollowMyHealth portal, you will also be able to view your health information using other applications (apps) compatible with our system.

## 2023-09-09 NOTE — DISCHARGE NOTE PROVIDER - NSDCCPCAREPLAN_GEN_ALL_CORE_FT
PRINCIPAL DISCHARGE DIAGNOSIS  Diagnosis: Cholecystitis  Assessment and Plan of Treatment: Follow up: Please call and make an appointment with the Acute Care Surgery Clinic 10-14 days after discharge. Also, please call and make an appointment with your primary care physician as per your usual schedule.   Activity: May return to normal activities as tolerated, however refrain from heavy lifting > 10-15 lbs.  Diet: May continue regular diet.  Medications: Please take all medications listed on your discharge paperwork as prescribed.   Wound Care: Please, keep wound site clean and dry. You may shower, but do not bathe, swim or submerge in water.  Patient is advised to RETURN TO THE EMERGENCY DEPARTMENT for any of the following - worsening pain, fever/chills, nausea/vomiting, altered mental status, chest pain, shortness of breath, or any other new / worsening symptom.

## 2023-09-09 NOTE — PROGRESS NOTE ADULT - NS ATTEND AMEND GEN_ALL_CORE FT
I have seen and examined the patient during rounds form [id number] hrs  events noted    interviewed wit interpreted.  Pain under control  tolerating diet   LFT normal  Home today

## 2023-09-09 NOTE — DISCHARGE NOTE PROVIDER - NSFOLLOWUPCLINICS_GEN_ALL_ED_FT
[facility] Acute Care Surgery  Acute Care Surgery  [address] [place] [id number]  Phone: [phone]  Fax:

## 2023-09-19 PROBLEM — Z00.00 ENCOUNTER FOR PREVENTIVE HEALTH EXAMINATION: Status: ACTIVE | Noted: 2023-09-19

## 2023-09-19 LAB — SURGICAL PATHOLOGY STUDY: SIGNIFICANT CHANGE UP

## 2023-09-21 ENCOUNTER — APPOINTMENT (OUTPATIENT)
Dept: TRAUMA SURGERY | Facility: CLINIC | Age: 53
End: 2023-09-21
Payer: MEDICAID

## 2023-09-21 VITALS
HEIGHT: 70 IN | DIASTOLIC BLOOD PRESSURE: 71 MMHG | BODY MASS INDEX: 26.05 KG/M2 | RESPIRATION RATE: 14 BRPM | HEART RATE: 70 BPM | TEMPERATURE: 97.3 F | WEIGHT: 182 LBS | OXYGEN SATURATION: 97 % | SYSTOLIC BLOOD PRESSURE: 105 MMHG

## 2023-09-21 DIAGNOSIS — K81.9 CHOLECYSTITIS, UNSPECIFIED: ICD-10-CM

## 2023-09-21 DIAGNOSIS — Z78.9 OTHER SPECIFIED HEALTH STATUS: ICD-10-CM

## 2023-09-21 DIAGNOSIS — Z90.49 ACQUIRED ABSENCE OF OTHER SPECIFIED PARTS OF DIGESTIVE TRACT: ICD-10-CM

## 2023-09-21 PROCEDURE — 99024 POSTOP FOLLOW-UP VISIT: CPT

## 2023-10-28 ENCOUNTER — OFFICE (OUTPATIENT)
Dept: URBAN - METROPOLITAN AREA CLINIC 6 | Facility: CLINIC | Age: 53
Setting detail: OPHTHALMOLOGY
End: 2023-10-28
Payer: COMMERCIAL

## 2023-10-28 DIAGNOSIS — H01.002: ICD-10-CM

## 2023-10-28 DIAGNOSIS — H01.005: ICD-10-CM

## 2023-10-28 DIAGNOSIS — H01.001: ICD-10-CM

## 2023-10-28 DIAGNOSIS — H01.004: ICD-10-CM

## 2023-10-28 DIAGNOSIS — H25.13: ICD-10-CM

## 2023-10-28 DIAGNOSIS — H40.1132: ICD-10-CM

## 2023-10-28 PROCEDURE — 99212 OFFICE O/P EST SF 10 MIN: CPT | Performed by: OPHTHALMOLOGY

## 2023-10-28 ASSESSMENT — REFRACTION_AUTOREFRACTION
OS_SPHERE: -5.25
OS_CYLINDER: -5.00
OD_AXIS: 028
OD_SPHERE: -3.50
OD_CYLINDER: -3.00
OS_AXIS: 166

## 2023-10-28 ASSESSMENT — REFRACTION_MANIFEST
OD_SPHERE: -3.25
OS_SPHERE: BALANCE
OD_AXIS: 25
OS_VA1: 20/FC
OD_VA1: 20/30+1
OD_CYLINDER: -3.00

## 2023-10-28 ASSESSMENT — KERATOMETRY
METHOD_AUTO_MANUAL: AUTO
OS_K1POWER_DIOPTERS: 38.50
OS_AXISANGLE_DEGREES: 073
OD_AXISANGLE_DEGREES: 116
OD_K2POWER_DIOPTERS: 41.00
OS_K2POWER_DIOPTERS: 41.75
OD_K1POWER_DIOPTERS: 39.00

## 2023-10-28 ASSESSMENT — SPHEQUIV_DERIVED
OD_SPHEQUIV: -5
OD_SPHEQUIV: -4.75
OS_SPHEQUIV: -7.75

## 2023-10-28 ASSESSMENT — CONFRONTATIONAL VISUAL FIELD TEST (CVF)
OD_FINDINGS: FULL
OS_FINDINGS: FULL

## 2023-10-28 ASSESSMENT — LID EXAM ASSESSMENTS
OD_BLEPHARITIS: RLL RUL
OS_BLEPHARITIS: LLL LUL

## 2023-10-28 ASSESSMENT — REFRACTION_CURRENTRX
OS_VPRISM_DIRECTION: SV
OD_AXIS: 17
OS_CYLINDER: SPHERE
OS_OVR_VA: 20/
OD_VPRISM_DIRECTION: SV
OS_SPHERE: -4.00
OD_SPHERE: -3.25
OD_OVR_VA: 20/
OD_CYLINDER: -3.50

## 2023-10-28 ASSESSMENT — PACHYMETRY
OD_CT_CORRECTION: -1
OD_CT_UM: 552
OS_CT_UM: 562
OS_CT_CORRECTION: -1

## 2023-10-28 ASSESSMENT — AXIALLENGTH_DERIVED
OD_AL: 27.2089
OD_AL: 27.3391
OS_AL: 28.7893

## 2023-10-28 ASSESSMENT — TONOMETRY
OS_IOP_MMHG: 13
OD_IOP_MMHG: 11

## 2023-10-28 ASSESSMENT — VISUAL ACUITY
OD_BCVA: CF 3FT
OS_BCVA: 20/30-2

## 2023-12-06 ENCOUNTER — OFFICE (OUTPATIENT)
Dept: URBAN - METROPOLITAN AREA CLINIC 94 | Facility: CLINIC | Age: 53
Setting detail: OPHTHALMOLOGY
End: 2023-12-06
Payer: MEDICAID

## 2023-12-06 DIAGNOSIS — H35.413: ICD-10-CM

## 2023-12-06 DIAGNOSIS — H59.812: ICD-10-CM

## 2023-12-06 DIAGNOSIS — H33.8: ICD-10-CM

## 2023-12-06 PROCEDURE — 92012 INTRM OPH EXAM EST PATIENT: CPT | Performed by: OPHTHALMOLOGY

## 2023-12-06 PROCEDURE — 92134 CPTRZ OPH DX IMG PST SGM RTA: CPT | Performed by: OPHTHALMOLOGY

## 2023-12-06 ASSESSMENT — REFRACTION_AUTOREFRACTION
OD_AXIS: 028
OS_AXIS: 166
OS_SPHERE: -5.25
OD_SPHERE: -3.50
OD_CYLINDER: -3.00
OS_CYLINDER: -5.00

## 2023-12-06 ASSESSMENT — LID EXAM ASSESSMENTS
OD_BLEPHARITIS: RLL RUL
OS_BLEPHARITIS: LLL LUL

## 2023-12-06 ASSESSMENT — SPHEQUIV_DERIVED
OD_SPHEQUIV: -5
OS_SPHEQUIV: -7.75

## 2023-12-06 ASSESSMENT — CONFRONTATIONAL VISUAL FIELD TEST (CVF)
OS_FINDINGS: FULL
OD_FINDINGS: FULL

## 2024-06-17 ENCOUNTER — OFFICE (OUTPATIENT)
Dept: URBAN - METROPOLITAN AREA CLINIC 63 | Facility: CLINIC | Age: 54
Setting detail: OPHTHALMOLOGY
End: 2024-06-17
Payer: MEDICAID

## 2024-06-17 DIAGNOSIS — H35.413: ICD-10-CM

## 2024-06-17 DIAGNOSIS — H33.8: ICD-10-CM

## 2024-06-17 DIAGNOSIS — H59.812: ICD-10-CM

## 2024-06-17 PROCEDURE — 92014 COMPRE OPH EXAM EST PT 1/>: CPT | Performed by: OPHTHALMOLOGY

## 2024-06-17 PROCEDURE — 92134 CPTRZ OPH DX IMG PST SGM RTA: CPT | Performed by: OPHTHALMOLOGY

## 2024-06-17 ASSESSMENT — CONFRONTATIONAL VISUAL FIELD TEST (CVF)
OS_FINDINGS: FULL
OD_FINDINGS: FULL

## 2024-06-17 ASSESSMENT — LID EXAM ASSESSMENTS
OD_BLEPHARITIS: RLL RUL
OS_BLEPHARITIS: LLL LUL

## 2024-08-06 ENCOUNTER — OFFICE (OUTPATIENT)
Dept: URBAN - METROPOLITAN AREA CLINIC 6 | Facility: CLINIC | Age: 54
Setting detail: OPHTHALMOLOGY
End: 2024-08-06
Payer: MEDICAID

## 2024-08-06 DIAGNOSIS — H25.13: ICD-10-CM

## 2024-08-06 DIAGNOSIS — H01.005: ICD-10-CM

## 2024-08-06 DIAGNOSIS — H01.001: ICD-10-CM

## 2024-08-06 DIAGNOSIS — H40.1132: ICD-10-CM

## 2024-08-06 DIAGNOSIS — H01.004: ICD-10-CM

## 2024-08-06 DIAGNOSIS — H52.13: ICD-10-CM

## 2024-08-06 DIAGNOSIS — H01.002: ICD-10-CM

## 2024-08-06 PROCEDURE — 92015 DETERMINE REFRACTIVE STATE: CPT | Performed by: OPHTHALMOLOGY

## 2024-08-06 PROCEDURE — 92133 CPTRZD OPH DX IMG PST SGM ON: CPT | Performed by: OPHTHALMOLOGY

## 2024-08-06 PROCEDURE — 99213 OFFICE O/P EST LOW 20 MIN: CPT | Performed by: OPHTHALMOLOGY

## 2024-08-06 ASSESSMENT — LID EXAM ASSESSMENTS
OD_BLEPHARITIS: RLL RUL
OS_BLEPHARITIS: LLL LUL

## 2025-07-16 NOTE — ED ADULT NURSE NOTE - LATERALITY
Patient vitals has been completed and entered. Also reviewed medication and allergies with the patient.    right

## (undated) DEVICE — GLV 8 PROTEXIS (WHITE)

## (undated) DEVICE — WARMING BLANKET UPPER ADULT

## (undated) DEVICE — TROCAR COVIDIEN VERSAPORT BLADELESS OPTICAL 12MM STANDARD

## (undated) DEVICE — D HELP - CLEARVIEW CLEARIFY SYSTEM

## (undated) DEVICE — PACK GENERAL LAPAROSCOPY

## (undated) DEVICE — TROCAR COVIDIEN VERSAONE FIXATION CANNULA 5MM

## (undated) DEVICE — ELCTR ROCKER SWITCH PENCIL BLUE 10FT

## (undated) DEVICE — SUT MONOCRYL 4-0 27" PS-2 UNDYED

## (undated) DEVICE — SUT VICRYL 0 27" UR-6

## (undated) DEVICE — SOL IRR POUR NS 0.9% 1000ML

## (undated) DEVICE — ENDOCATCH 10MM SPECIMEN POUCH

## (undated) DEVICE — Device

## (undated) DEVICE — VENODYNE/SCD SLEEVE CALF MEDIUM

## (undated) DEVICE — TROCAR COVIDIEN BLUNT TIP HASSAN 12MM

## (undated) DEVICE — DRAPE C ARM UNIVERSAL

## (undated) DEVICE — DRSG DERMABOND 0.7ML

## (undated) DEVICE — TUBING STRYKEFLOW II SUCTION / IRRIGATOR

## (undated) DEVICE — GLV 7.5 PROTEXIS (WHITE)

## (undated) DEVICE — TROCAR COVIDIEN VERSAPORT BLADELESS OPTICAL 5MM STANDARD

## (undated) DEVICE — ELCTR GROUNDING PAD ADULT COVIDIEN

## (undated) DEVICE — INSUFFLATION NDL COVIDIEN SURGINEEDLE VERESS 120MM

## (undated) DEVICE — SOL IRR POUR H2O 1000ML

## (undated) DEVICE — SYR CONTROL LUER LOK 10CC

## (undated) DEVICE — WECK EFX CLASSIC FASCIAL CLOSURE SYSTEM